# Patient Record
Sex: MALE | Race: WHITE
[De-identification: names, ages, dates, MRNs, and addresses within clinical notes are randomized per-mention and may not be internally consistent; named-entity substitution may affect disease eponyms.]

---

## 2019-11-14 ENCOUNTER — HOSPITAL ENCOUNTER (INPATIENT)
Dept: HOSPITAL 92 - ERS | Age: 44
LOS: 1 days | Discharge: HOME | DRG: 418 | End: 2019-11-15
Attending: SURGERY | Admitting: SURGERY
Payer: COMMERCIAL

## 2019-11-14 VITALS — BODY MASS INDEX: 36.9 KG/M2

## 2019-11-14 DIAGNOSIS — K80.13: Primary | ICD-10-CM

## 2019-11-14 DIAGNOSIS — Q60.0: ICD-10-CM

## 2019-11-14 LAB
ALBUMIN SERPL BCG-MCNC: 4.7 G/DL (ref 3.5–5)
ALP SERPL-CCNC: 68 U/L (ref 40–110)
ALT SERPL W P-5'-P-CCNC: 46 U/L (ref 8–55)
ANION GAP SERPL CALC-SCNC: 12 MMOL/L (ref 10–20)
AST SERPL-CCNC: 25 U/L (ref 5–34)
BASOPHILS # BLD AUTO: 0.1 THOU/UL (ref 0–0.2)
BASOPHILS NFR BLD AUTO: 0.5 % (ref 0–1)
BILIRUB SERPL-MCNC: 1.2 MG/DL (ref 0.2–1.2)
BUN SERPL-MCNC: 14 MG/DL (ref 8.9–20.6)
CALCIUM SERPL-MCNC: 10 MG/DL (ref 7.8–10.44)
CHLORIDE SERPL-SCNC: 101 MMOL/L (ref 98–107)
CO2 SERPL-SCNC: 28 MMOL/L (ref 22–29)
CREAT CL PREDICTED SERPL C-G-VRATE: 0 ML/MIN (ref 70–130)
EOSINOPHIL # BLD AUTO: 0.1 THOU/UL (ref 0–0.7)
EOSINOPHIL NFR BLD AUTO: 0.8 % (ref 0–10)
GLOBULIN SER CALC-MCNC: 2.9 G/DL (ref 2.4–3.5)
GLUCOSE SERPL-MCNC: 137 MG/DL (ref 70–105)
HGB BLD-MCNC: 16 G/DL (ref 14–18)
LIPASE SERPL-CCNC: 20 U/L (ref 8–78)
LYMPHOCYTES # BLD: 2.7 THOU/UL (ref 1.2–3.4)
LYMPHOCYTES NFR BLD AUTO: 24 % (ref 21–51)
MCH RBC QN AUTO: 31.2 PG (ref 27–31)
MCV RBC AUTO: 91 FL (ref 78–98)
MONOCYTES # BLD AUTO: 0.9 THOU/UL (ref 0.11–0.59)
MONOCYTES NFR BLD AUTO: 7.7 % (ref 0–10)
NEUTROPHILS # BLD AUTO: 7.4 THOU/UL (ref 1.4–6.5)
NEUTROPHILS NFR BLD AUTO: 67 % (ref 42–75)
PLATELET # BLD AUTO: 256 THOU/UL (ref 130–400)
POTASSIUM SERPL-SCNC: 4.1 MMOL/L (ref 3.5–5.1)
RBC # BLD AUTO: 5.13 MILL/UL (ref 4.7–6.1)
SODIUM SERPL-SCNC: 137 MMOL/L (ref 136–145)
WBC # BLD AUTO: 11.1 THOU/UL (ref 4.8–10.8)

## 2019-11-14 PROCEDURE — 74177 CT ABD & PELVIS W/CONTRAST: CPT

## 2019-11-14 PROCEDURE — 76705 ECHO EXAM OF ABDOMEN: CPT

## 2019-11-14 PROCEDURE — 85025 COMPLETE CBC W/AUTO DIFF WBC: CPT

## 2019-11-14 PROCEDURE — 83690 ASSAY OF LIPASE: CPT

## 2019-11-14 PROCEDURE — 84484 ASSAY OF TROPONIN QUANT: CPT

## 2019-11-14 PROCEDURE — 93005 ELECTROCARDIOGRAM TRACING: CPT

## 2019-11-14 PROCEDURE — 80053 COMPREHEN METABOLIC PANEL: CPT

## 2019-11-14 PROCEDURE — 36415 COLL VENOUS BLD VENIPUNCTURE: CPT

## 2019-11-14 PROCEDURE — 81003 URINALYSIS AUTO W/O SCOPE: CPT

## 2019-11-14 NOTE — CT
CT ABDOMEN AND PELVIS WITH IV CONTRAST:



HISTORY: Right upper quadrant and right lower quadrant abdominal pain. Dark urine. Intermittent nause
a and dizziness. Congenital absence of right kidney



COMPARISON: None



FINDINGS:

The lung bases are clear. A 2.4 cm peripherally calcified gallstone is present. There is fatty infilt
ration of the liver without focal mass or abnormal biliary ductal dilatation. The spleen, pancreas,

adrenal glands and left kidney are normal. The right kidney is absent consistent with history of wallace
enital absence.



No free air, free fluid or lymphadenopathy seen in the abdomen or pelvis. There are vascular calcific
ations without evidence of aneurysmal dilatation of the abdominal aorta. No osteolytic or

osteoblastic lesions are seen. The small bowel loops are not abnormally dilated. A normal-appearing a
ppendix is present.



IMPRESSION:

1. Fatty liver

2. Cholelithiasis

3. Congenital absence of right kidney

4. No evidence of appendicitis.



Reported By: Gilberto Max 

Electronically Signed:  11/14/2019 12:22 PM

## 2019-11-14 NOTE — HP
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

__________[facility], [facility], with several months history of right upper

quadrant pain, back radiation, but the last few days has been especially severe.

__________ emergency room with a white count of 11, hemoglobin 16.  Liver function

tests are normal.  Ultrasound of abdomen and abdominal pelvic CAT scan ordered.

These were done at 11:00 p.m. and 12:00 p.m., I was called at 3:00 p.m.  CAT scan

reveals cholelithiasis, congenital absence of the right kidney and a fatty liver.

Abdominal ultrasound reveals cholelithiasis, sonographic positive Moncada sign,

normal bile duct caliber. 



ALLERGIES:  NONE.



TOBACCO:  None.



ALCOHOL:  Rarely.



MEDICATIONS:  None routinely.



PAST SURGICAL HISTORY:  Noncontributory.



PAST MEDICAL HISTORY:  Noncontributory.



REVIEW OF SYSTEMS:  Ten-point noncontributory.



PHYSICAL EXAMINATION:

VITAL SIGNS:  Heart rate 74, respiratory rate 18, blood pressure 130/70. 

HEAD, EARS, EYES, NOSE AND THROAT:  Unremarkable.  Sclerae are nonicteric. 

LUNGS:  Clear to auscultation. 

CARDIAC:  Regular rate and rhythm without murmur or gallop. 

ABDOMEN:  Soft, tenderness in the right upper quadrant.  No guarding or rebound. 

EXTREMITIES:  Unremarkable.



ASSESSMENT AND PLAN:  Acute cholecystitis.  We would recommend laparoscopic video

cholecystectomy.  Unfortunately about 3:00 p.m., there were no OR rooms available to

late this evening.  Therefore, instead of an outpatient he will have to be 

admitted tonight and this will be performed tomorrow.  He is in too much pain to go

home.  We will plan Levaquin overnight.  Full liquids tonight, n.p.o. after

midnight, and laparoscopic cholecystectomy tomorrow.  Risks of infection, bleeding,

visceral, and biliary injury explained, he consents. 







Job ID:  [id number]

## 2019-11-14 NOTE — ULT
Right upper quadrant ultrasound:

11/14/2019



COMPARISON: None



HISTORY: Right upper quadrant pain



TECHNIQUE: Multiplanar grayscale sonographic imaging of the right upper quadrant provided.



FINDINGS: The hepatic parenchyma is heterogeneous and echogenic, consistent with hepatocellular disea
se, such as steatosis.



There is a 2.3 cm gallstone in the region of the gallbladder neck which may be lodged. The sonographe
r reports a positive Moncada's sign.



There is mild gallbladder wall thickening measuring approximately 3 mm. No significant pericholecysti
c fluid.



The common bile duct measures approximately 6 mm, upper limits of normal. Right kidney is nonvisualiz
ed.



IMPRESSION: Prominent gallstone lodged within the region of the gallbladder neck with positive Moncada
's sign and mild gallbladder wall thickening. Findings are concerning for acute cholecystitis.



Reported By: Maurice Starks 

Electronically Signed:  11/14/2019 1:41 PM

## 2019-11-15 VITALS — DIASTOLIC BLOOD PRESSURE: 61 MMHG | SYSTOLIC BLOOD PRESSURE: 110 MMHG | TEMPERATURE: 98.5 F

## 2019-11-15 PROCEDURE — 0FT44ZZ RESECTION OF GALLBLADDER, PERCUTANEOUS ENDOSCOPIC APPROACH: ICD-10-PCS | Performed by: SURGERY

## 2019-11-15 NOTE — OP
DATE OF PROCEDURE:  11/15/2019



PREOPERATIVE DIAGNOSES:  Acute on chronic cholecystitis, cholelithiasis.



POSTOPERATIVE DIAGNOSES:  Acute on chronic cholecystitis, cholelithiasis.



PROCEDURE PERFORMED:  Laparoscopic video cholecystectomy.



ANESTHESIA:  General, local 0.5% Marcaine with epinephrine.



DESCRIPTION OF PROCEDURE:  The patient was taken to the operating room where under

general anesthesia, the abdomen was clipped of hair, prepared with ChloraPrep and

draped in routine fashion.  Local anesthetic was infiltrated in the skin and

subcutaneous tissue about each port site.  Infraumbilical incision was made.

Pneumoperitoneum to 15 mmHg was obtained with a Veress needle, replaced with a 5

port, video laparoscope was inserted.  Right subxiphoid incision was made and 11

port was placed.  Right subcostal incision was made at midclavicular and anterior

axillary line, and 5 ports placed.  Liver appeared to be normal.  Gallbladder

acutely inflamed, edematous, difficult to grasp.  Fundus was grasped at the

cephalad.  There were omental adhesions, taken down using cautery for hemostasis.

Infundibulum was identified, grasped.  There was a large stone obstructing the

gallbladder outlet.  Cystic artery and duct were dissected free.  Critical view was

obtained.  Cystic artery and duct were double clipped proximally, divided, and

gallbladder was dissected free from its inflammatory attachments to the liver bed

using cautery, obtaining good hemostasis of the gallbladder and large obstructing

gallstone was removed.  Gallbladder and gallstones were submitted to Pathology.

Good hemostasis was ensured with cautery.  Irrigant and pneumoperitoneum were

evacuated.  All instruments were removed.  All skin incisions were approximated with

interrupted subdermal 4-0 Monocryl and Derma glue applied.  Subxiphoid fascia was

approximated with 0 Vicryl. 







Job ID:  [id number]

## 2019-11-15 NOTE — DIS
DATE OF ADMISSION:  11/14/2019



DATE OF DISCHARGE:  11/15/2019



DISCHARGE DIAGNOSIS:  Acute on chronic cholecystitis, large stone obstructing the

gallbladder outlet. 



IMAGING:  In the emergency room, CAT scan of the abdomen and pelvis and ultrasound

revealing acute cholecystitis and large stone obstructing gallbladder outlet.  Liver

function tests normal. 



PROCEDURES:  On 11/15/2019, laparoscopic video cholecystectomy, acute cholecystitis

appreciated. 



DISCHARGE MEDICATIONS:  

1. Ultram p.r.n. pain.

2. Over-the-counter Tylenol and ibuprofen.

3. Levaquin 500 a day for 5 days.



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

emergency room with a long history of biliary colic, but acute pain for the last 2

to 3 days.  It was associated with nausea and radiation to his right flank.  He

underwent the above radiological imaging studies confirming cholecystitis and at 2

o'clock in the afternoon, there was no OR space available for cholecystectomy, thus

he was admitted overnight and underwent cholecystectomy 24 hours later and is

discharged home postoperatively. 







Job ID:  [id number]

## 2020-01-04 ENCOUNTER — HOSPITAL ENCOUNTER (EMERGENCY)
Dept: HOSPITAL 92 - ERS | Age: 45
Discharge: HOME | End: 2020-01-04
Payer: COMMERCIAL

## 2020-01-04 DIAGNOSIS — J06.9: Primary | ICD-10-CM

## 2020-01-04 PROCEDURE — 71045 X-RAY EXAM CHEST 1 VIEW: CPT

## 2020-01-04 PROCEDURE — 87804 INFLUENZA ASSAY W/OPTIC: CPT

## 2020-01-04 NOTE — RAD
XR Chest 1 View Portable



HISTORY: Cough, fever



COMPARISON: None



FINDINGS: The heart size is normal. The lungs are well expanded without focal areas of consolidation,
 pneumothorax or pleural effusions.



IMPRESSION: No radiographic evidence of acute cardiopulmonary process.



Reported By: Gilberto Max 

Electronically Signed:  1/4/2020 12:35 PM